# Patient Record
Sex: MALE | Race: WHITE | Employment: FULL TIME | URBAN - NONMETROPOLITAN AREA
[De-identification: names, ages, dates, MRNs, and addresses within clinical notes are randomized per-mention and may not be internally consistent; named-entity substitution may affect disease eponyms.]

---

## 2022-06-30 ENCOUNTER — HOSPITAL ENCOUNTER (OUTPATIENT)
Dept: PHYSICAL THERAPY | Age: 82
Setting detail: THERAPIES SERIES
Discharge: HOME | End: 2022-06-30
Payer: MEDICARE

## 2022-06-30 PROCEDURE — 97161 PT EVAL LOW COMPLEX 20 MIN: CPT

## 2022-06-30 PROCEDURE — 97112 NEUROMUSCULAR REEDUCATION: CPT

## 2022-06-30 NOTE — PROGRESS NOTES
Phone: [address]          Fax: [phone]                      Outpatient Physical Therapy                                                             Vestibular Evaluation  Date: 2022  Patient: Jose Carcamo  : 1940  [facility] #: [id number]  Referring Physician: Unknown, Provider, MD     Diagnosis: Dizziness and giddiness, R42    Treatment Diagnosis: BPPV, vertigo  Onset Date: 22  PT Insurance Information: Medicare  Total # of Visits Approved: 8   Total # of Visits to Date: 1  No Show: 0  Canceled Appointment: 0     Subjective  Subjective: The patient reports previous episodes of dizziness and has been treated in the past.  He reports episodes of vertigo over the past month that occurs when he's looking down, looking up, and rolling to his right in bed. He reports episodes typically last a minute or less.     Objective  Strength RUE  Strength RUE: WFL  Strength LUE  Strength LUE: WFL    Oculomotor Examination  Visual/Occulomotor Tests  Visual/Occulomotor Assessed: Yes  Peripheral Vision: Intact  Spontaneous Nystagmus: Intact  Gaze Holding Nystagmus: Yes  Eye Movement Range: Normal  Smooth pursuits horizontal: Able to track stimulus in all quadrants without difficulty  Smooth pursuits vertical: Able to track stimulus in all quadrants without difficulty  Saccades horizontal:  Within Defined Limits    Fixation blocked / Ronnie Hannah / Infared Goggles  Visual/Occulomotor Tests  with Frenzel/Infared Goggles  Spontaneous Nystagmus: Intact  Gaze Holding Nystagmus: Yes    Positional Testing  Positional Testings  Modified Vertebral Artery Test: Negative  Right Omari-Hallpike Test: Vertigo (Upbeat and torsional lasting less than one minute)    Exercises:  Exercise 1: R Epley x2    Assessment  Body Structures, Functions, Activity Limitations Requiring Skilled Therapeutic Intervention: Decreased functional mobility ,Decreased ROM,Decreased high-level IADLs,Decreased balance,Decreased endurance,Vestibular Impairment,Decreased posture  Assessment: The patient is an 80 y.o. male who reports a month history of vertigo. On exam he demonstrates decreased cervical spine rotation ROM, positive gaze holding nystagmus, and positive R Omari-Hallpike. He was treated with the Epley maneuver x2 with last repetition demonstrating nystagmus when turning his head to the left. Therapy Prognosis: Good        Decision Making: Low Complexity    Patient Education  Patient Education: PT POC, HEP  Pt verbalized/demonstrated good understanding:     [x] Yes         [] No, pt required further clarification. Goals  Short Term Goals  Time Frame for Short term goals: 2 weeks  Short term goal 1: Patient will be initiated with a HEP  Short term goal 2: Patient will tolerate canalith repositioning maneuvers. Long Term Goals  Time Frame for Long term goals : 4 weeks  Long term goal 1: Patient will be independent and compliant with a HEP  Long term goal 2: Patient will demonstrate a negative bilateral Omari-Hallpike. Long term goal 3: Patient will report 80% improvement in symptoms and overall function.     Patient goals : Get better    Minutes Tracking:  Time In: 0830  Time Out: 2537  Minutes: 56  Timed Code Treatment Minutes: [address], [place], DPT   Date: 6/30/2022

## 2022-06-30 NOTE — PLAN OF CARE
[facility]           Phone: [phone]             Outpatient Physical Therapy  Fax: [phone]                                           Date: 2022  Patient: Elif Morales : 1940 [facility] #: [id number]   Referring Physician: Unknown, Provider, MD      [x] Plan of Care   [] Updated Plan of Care    Dates of Service to Include: 2022 to 22    Diagnosis:  Dizziness and giddiness, R42    Rehab (Treatment) Diagnosis:  BPPV, vertigo             Onset Date:  22    Attendance  Total # of Visits to Date: 1 No Show: 0 Canceled Appointment: 0    Assessment  Body Structures, Functions, Activity Limitations Requiring Skilled Therapeutic Intervention: Decreased functional mobility ,Decreased ROM,Decreased high-level IADLs,Decreased balance,Decreased endurance,Vestibular Impairment,Decreased posture  Assessment: The patient is an 80 y.o. male who reports a month history of vertigo. On exam he demonstrates decreased cervical spine rotation ROM, positive gaze holding nystagmus, and positive R [place]-Hallpike. He was treated with the Epley maneuver x2 with last repetition demonstrating nystagmus when turning his head to the left. Goals  Short Term Goals  Time Frame for Short term goals: 2 weeks  Short term goal 1: Patient will be initiated with a HEP  Short term goal 2: Patient will tolerate canalith repositioning maneuvers. Long Term Goals  Time Frame for Long term goals : 4 weeks  Long term goal 1: Patient will be independent and compliant with a HEP  Long term goal 2: Patient will demonstrate a negative bilateral Omari-Hallpike. Long term goal 3: Patient will report 80% improvement in symptoms and overall function.      Prognosis  Therapy Prognosis: Good    Treatment Plan   Plan Frequency: 2  Plan weeks: 4  [x] HP/CP      [] Electrical Stim   [x] Therapeutic Exercise      [x] Gait Training  [] Aquatics   []

## 2022-07-01 ENCOUNTER — HOSPITAL ENCOUNTER (OUTPATIENT)
Dept: PHYSICAL THERAPY | Age: 82
Setting detail: THERAPIES SERIES
Discharge: HOME | End: 2022-07-01
Payer: MEDICARE

## 2022-07-01 PROCEDURE — 97112 NEUROMUSCULAR REEDUCATION: CPT

## 2022-07-01 NOTE — PROGRESS NOTES
Phone: Padmini           Fax: [phone]                           Outpatient Physical Therapy                                                                            Daily Note    Patient: Juju Moya : 1940  CSN #: [id number]   Referring Physician: Unknown, Provider, MD    Date: 2022    Diagnosis: Dizziness and giddiness, R42  Treatment Diagnosis: BPPV, vertigo    Onset Date: 22  PT Insurance Information: Medicare  Total # of Visits Approved: 8 Per Physician Order  Total # of Visits to Date: 2  No Show: 0  Canceled Appointment: 0      Pre-Treatment Pain:  0/10  Subjective: The patient reports feeling better today, but continued to have dizziness/vertigo when performing chores this morning and with getting out of bed. Exercises:  Exercise 2: L Epley x3    Assessment  Body Structures, Functions, Activity Limitations Requiring Skilled Therapeutic Intervention: Decreased functional mobility ,Decreased ROM,Decreased high-level IADLs,Decreased balance,Decreased endurance,Vestibular Impairment,Decreased posture  Assessment: Patient with positive L Omari-Hallpike with upbeat and torsional nystagmus. Patient was treated with the Epley maneuver x3 with decreasing nystagmus and symptoms. He was given the [employer] maneuver to continue with his HEP. Activity Tolerance  Activity Tolerance: Patient tolerated evaluation without incident    Patient Education  Patient Education: PT POC, HEP  Pt verbalized/demonstrated good understanding:     [x] Yes         [] No, pt required further clarification. Post Treatment Pain:  0/10      Plan  Plan Frequency: 2  Plan weeks: 4       Goals  (Total # of Visits to Date: 2)      Short Term Goals  Time Frame for Short term goals: 2 weeks  Short term goal 1: Patient will be initiated with a HEP  Short term goal 2: Patient will tolerate canalith repositioning maneuvers.     Long Term Goals  Time Frame for Long term goals : 4 weeks  Long term goal 1: Patient will be independent and compliant with a HEP  Long term goal 2: Patient will demonstrate a negative bilateral Omari-Hallpike. Long term goal 3: Patient will report 80% improvement in symptoms and overall function.     Minutes Tracking:  Time In: 4395  Time Out: 0957  Minutes: 47  Timed Code Treatment Minutes: 221 Mele Miles, DPT     Date: 7/1/2022

## 2022-12-22 NOTE — DISCHARGE SUMMARY
Phone: Padmini          Fax: [phone]                            Outpatient Physical Therapy                                                                    Discharge Summary    Patient: Leon Headings  : 1940  [facility] #: [id number]   Referring Physician: Everett Arce MD   Diagnosis: Dizziness and giddiness, R42  Treatment Diagnosis: BPPV, vertigo      Date Treatment Initiated: 22  Date of Last Treatment: 22      PT Visit Information  Onset Date: 22  PT Insurance Information: Medicare  Total # of Visits Approved: 8  Total # of Visits to Date: 2  Plan of Care/Certification Expiration Date: 22  No Show: 0  Canceled Appointment: 0      Frequency/Duration  Plan Frequency: 2 times per week  Plan weeks: 4 weeks      Treatment Received  [x] HP/CP      [] Electrical Stim   [x] Therapeutic Exercise      [x] Gait Training  [] Aquatics   [] Ultrasound         [x] Patient Education/HEP   [x] Manual Therapy  [] Traction    [x] Neuro-ranjit        [x] Soft Tissue Mobs            [] Home TENS  [] Iontophoresis    [] Orthotic casting/fitting      [] Dry Needling    Assessment  Assessment: Patient with positive L [place]-Hallpike with upbeat and torsional nystagmus. Patient was treated with the Epley maneuver x3 with decreasing nystagmus and symptoms. He was given the [employer] maneuver to continue with his HEP. The patient did not schedule further visits and will be discharged at this time. Goals  Short Term Goals  Time Frame for Short Term Goals: 2 weeks  Short Term Goal 1: Patient will be initiated with a HEP  Short Term Goal 2: Patient will tolerate canalith repositioning maneuvers. Long Term Goals  Time Frame for Long Term Goals : 4 weeks  Long Term Goal 1: Patient will be independent and compliant with a HEP  Long Term Goal 2: Patient will demonstrate a negative bilateral Omari-Hallpike.   Long Term Goal 3: Patient will report 80% improvement in symptoms and overall function.       Reason for Discharge  [] Goals Achieved                        []  Poor Follow Through/Attendance                  []  Optimal Function Achieved     [x]  Patient Discharged Self    []  Hospitalization                         []  Physician discharge      Thank you for this referral      Carlos Cruz PT, DPT               Date: 12/22/2022